# Patient Record
Sex: FEMALE | Race: WHITE | NOT HISPANIC OR LATINO | URBAN - METROPOLITAN AREA
[De-identification: names, ages, dates, MRNs, and addresses within clinical notes are randomized per-mention and may not be internally consistent; named-entity substitution may affect disease eponyms.]

---

## 2019-05-29 PROBLEM — O13.9 GESTATIONAL HYPERTENSION: Status: ACTIVE | Noted: 2019-05-29

## 2020-07-25 ENCOUNTER — TELEPHONE ENCOUNTER (OUTPATIENT)
Dept: URBAN - METROPOLITAN AREA CLINIC 13 | Facility: CLINIC | Age: 40
End: 2020-07-25

## 2020-07-26 ENCOUNTER — TELEPHONE ENCOUNTER (OUTPATIENT)
Dept: URBAN - METROPOLITAN AREA CLINIC 13 | Facility: CLINIC | Age: 40
End: 2020-07-26

## 2021-07-09 ENCOUNTER — TELEPHONE (OUTPATIENT)
Dept: UROLOGY | Facility: CLINIC | Age: 41
End: 2021-07-09

## 2023-06-28 DIAGNOSIS — Z12.31 ENCOUNTER FOR SCREENING MAMMOGRAM FOR MALIGNANT NEOPLASM OF BREAST: Primary | ICD-10-CM

## 2023-06-29 ENCOUNTER — HOSPITAL ENCOUNTER (OUTPATIENT)
Dept: RADIOLOGY | Facility: HOSPITAL | Age: 43
Discharge: HOME | End: 2023-06-29
Attending: FAMILY MEDICINE
Payer: COMMERCIAL

## 2023-06-29 DIAGNOSIS — Z12.31 ENCOUNTER FOR SCREENING MAMMOGRAM FOR MALIGNANT NEOPLASM OF BREAST: ICD-10-CM

## 2023-06-29 PROCEDURE — 77067 SCR MAMMO BI INCL CAD: CPT | Mod: TC,PO

## 2023-07-06 ENCOUNTER — NUTRITION (OUTPATIENT)
Dept: NUTRITION | Facility: HOSPITAL | Age: 43
End: 2023-07-06
Payer: COMMERCIAL

## 2023-07-06 DIAGNOSIS — Z12.31 SCREENING MAMMOGRAM FOR HIGH-RISK PATIENT: ICD-10-CM

## 2023-07-06 DIAGNOSIS — E66.9 OBESITY, UNSPECIFIED CLASSIFICATION, UNSPECIFIED OBESITY TYPE, UNSPECIFIED WHETHER SERIOUS COMORBIDITY PRESENT: ICD-10-CM

## 2023-07-06 PROCEDURE — 97802 MEDICAL NUTRITION INDIV IN: CPT

## 2023-07-06 NOTE — PROGRESS NOTES
"Nutrition Assessment for Medical Nutrition Therapy    Referring professional: Alex Gregg MD    Reason for MNT visit: Pt in for education and nutrition counseling regarding  weight management and a healthier diet for management of medical conditions .     Medical History: Obesity, elevated BP         Pertinent Medications: no DM meds    Ht:   5'9"    Wt:  206lbs (93.6kg)     BMI: 30.4     Estimated energy requirements: 1668 kcal- 1868 kcal      Nutrition History       Weight status: currently stable    Meal patterns: 1-2 meals daily    Carbohydrate: inconsistent    Protein: Inadequate    Fruit:  Inconsistent    Vegetables:  Inconsistent    Meal preparation/shopping: self    Nutrition Na: Significant sources, seasoning choices, restaurant meals    Nutrition beverages: mainly drinks water    Dining out: Regular, 1-2 times per week    Motivation to change: high    MNT Recommendations:    Increase protein intake  Improve meal consistency/consistency of caloric intake  Reduce sodium intake  Reduce total calorie intake    Behavior goals: 1) Begin eating breakfast more consistently (preferably protein centric). 2) Increasing from snack to meal at lunch time. 3) When eating out, halve provided portions. 4) Opt for homemade or store bought low-sodium seasonings vs [employer] or similar high sodium options. 5) Strive to increase physical activity when possible.     Visit Summary:  Pt is a 43 y/o female in for education on weight loss on dietary changes for management of her health conditions. Ms. Cotton reports wanting to loose weight and improve her dietary habits in an effort to improve her cholesterol, blood pressure, and overall health. After conducting a 24 hr recall and discussing her normal dietary habits, Ms. Cotton appears to have inconsistent meal intake, often skipping breakfast, having only a small snack for lunch, and then having a large dinner. She states that by the end of the day she is often very " hungry and will overeat. She also reports eating out with friends on a weekly basis, and occasionally relying on eating out when work and life scheduling presses her for time.     Based off of her reports of overeating when hungry and inconstant meal pattern, we discussed strategies to minimize overeating, primarily planning meals earlier in the day. Provided and talked about the My[employer]ate strategy as a framework for planning these meals and additionally provided specific options for snacks/meals when busy. As the pt often used high-salt/sodium seasonings when preparing food, we discussed replacing these with lower sodium alternatives.     As eating out is an important part of Ms. Cotton's lifestyle, we talked about methods to reduce calorie and sodium intake when eating out. We established a goal to focus on reducing portions when at restaurants, boxing half of the meal before starting.    Encouraged the pt to increase her physical activity.     Pt stated she will reach out for follow up if her conditions do not improve as desired or new barriers present.       Nutrition follow-up: will follow up as needed    Comprehension: good     Counseling time: 45 Minutes

## 2024-07-09 DIAGNOSIS — Z12.31 ENCOUNTER FOR SCREENING MAMMOGRAM FOR MALIGNANT NEOPLASM OF BREAST: Primary | ICD-10-CM
